# Patient Record
Sex: FEMALE | Race: WHITE
[De-identification: names, ages, dates, MRNs, and addresses within clinical notes are randomized per-mention and may not be internally consistent; named-entity substitution may affect disease eponyms.]

---

## 2019-04-23 ENCOUNTER — HOSPITAL ENCOUNTER (OUTPATIENT)
Dept: HOSPITAL 95 - ORSCSDS | Age: 69
Discharge: HOME | End: 2019-04-23
Attending: SURGERY
Payer: MEDICARE

## 2019-04-23 VITALS — BODY MASS INDEX: 33.68 KG/M2 | WEIGHT: 178.38 LBS | HEIGHT: 60.98 IN

## 2019-04-23 DIAGNOSIS — F32.9: ICD-10-CM

## 2019-04-23 DIAGNOSIS — Z12.11: Primary | ICD-10-CM

## 2019-04-23 DIAGNOSIS — D12.0: ICD-10-CM

## 2019-04-23 DIAGNOSIS — K57.30: ICD-10-CM

## 2019-04-23 DIAGNOSIS — Z79.899: ICD-10-CM

## 2019-04-23 DIAGNOSIS — Z87.891: ICD-10-CM

## 2019-04-23 DIAGNOSIS — I10: ICD-10-CM

## 2019-04-23 DIAGNOSIS — E03.9: ICD-10-CM

## 2019-04-23 DIAGNOSIS — Z86.718: ICD-10-CM

## 2019-04-23 DIAGNOSIS — Z86.010: ICD-10-CM

## 2019-04-23 PROCEDURE — 0DBH8ZX EXCISION OF CECUM, VIA NATURAL OR ARTIFICIAL OPENING ENDOSCOPIC, DIAGNOSTIC: ICD-10-PCS | Performed by: SURGERY

## 2019-04-23 NOTE — NUR
04/23/19 1020 Justina Grossman
1ST IV ATTEMPT LEFT HAND, INFILTRATED
2ND IV ATTEMPT LAC,
3RD IV ATTEMPT LFA,